# Patient Record
Sex: MALE | Race: WHITE | URBAN - METROPOLITAN AREA
[De-identification: names, ages, dates, MRNs, and addresses within clinical notes are randomized per-mention and may not be internally consistent; named-entity substitution may affect disease eponyms.]

---

## 2017-06-14 ENCOUNTER — IMPORTED ENCOUNTER (OUTPATIENT)
Dept: URBAN - METROPOLITAN AREA CLINIC 50 | Facility: CLINIC | Age: 67
End: 2017-06-14

## 2017-06-15 ENCOUNTER — IMPORTED ENCOUNTER (OUTPATIENT)
Dept: URBAN - METROPOLITAN AREA CLINIC 50 | Facility: CLINIC | Age: 67
End: 2017-06-15

## 2017-07-24 ENCOUNTER — IMPORTED ENCOUNTER (OUTPATIENT)
Dept: URBAN - METROPOLITAN AREA CLINIC 50 | Facility: CLINIC | Age: 67
End: 2017-07-24

## 2017-11-20 ENCOUNTER — IMPORTED ENCOUNTER (OUTPATIENT)
Dept: URBAN - METROPOLITAN AREA CLINIC 50 | Facility: CLINIC | Age: 67
End: 2017-11-20

## 2019-05-13 ENCOUNTER — IMPORTED ENCOUNTER (OUTPATIENT)
Dept: URBAN - METROPOLITAN AREA CLINIC 50 | Facility: CLINIC | Age: 69
End: 2019-05-13

## 2019-09-19 NOTE — PATIENT DISCUSSION
Continue care with PCP. Monitor blood sugar and A1C levels. Recommended yearly dilated eye exams to monitor for ocular complications.

## 2019-09-19 NOTE — PATIENT DISCUSSION
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 Raghu Stephenson).

## 2019-09-19 NOTE — PATIENT DISCUSSION
Discussed with patient repeating HVF OS since OCT is changing,consider lowering IOP based on oct and HVF findings.

## 2019-09-19 NOTE — PATIENT DISCUSSION
Recommend artificial tears 3-4 times a day(samples to patient)Consider plugs if no improvement with Artificial tears.

## 2019-10-03 NOTE — PATIENT DISCUSSION
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 Christiano Rubi).

## 2020-05-14 NOTE — PATIENT DISCUSSION
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 Paulette Morales).

## 2020-05-18 ENCOUNTER — IMPORTED ENCOUNTER (OUTPATIENT)
Dept: URBAN - METROPOLITAN AREA CLINIC 50 | Facility: CLINIC | Age: 70
End: 2020-05-18

## 2020-06-15 NOTE — PATIENT DISCUSSION
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 Candi Valverde).

## 2020-06-15 NOTE — PATIENT DISCUSSION
IOP borderline today. [facility] spoke with Dr. Paulette Morales via phone on 6/2/20 regarding Pt's IOP (see communications). Per Pt, Dr. Paulette Morales did not start any additional drops yet.

## 2020-07-06 NOTE — PATIENT DISCUSSION
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 Larson).

## 2020-11-19 NOTE — PATIENT DISCUSSION
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 Preeti Cole).

## 2021-02-22 NOTE — PATIENT DISCUSSION
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 Anne Rose).

## 2021-04-18 ASSESSMENT — VISUAL ACUITY
OS_CC: J1+
OD_CC: 20/20
OD_CC: J1+
OD_BAT: 20/25
OS_BAT: 20/25
OD_CC: J1+
OD_OTHER: 20/25. 20/25.
OD_CC: 20/20
OS_CC: 20/20
OS_OTHER: 20/25. 20/30.
OS_CC: 20/20
OD_CC: J1+@ 15 IN
OS_CC: J1+@ 15 IN
OS_CC: 20/20
OD_CC: 20/20
OS_CC: 20/20
OS_CC: J1+
OS_CC: 20/20-
OD_CC: 20/20
OD_CC: 20/15

## 2021-04-18 ASSESSMENT — TONOMETRY
OD_IOP_MMHG: 17
OD_IOP_MMHG: 17
OS_IOP_MMHG: 17
OS_IOP_MMHG: 16
OD_IOP_MMHG: 15
OD_IOP_MMHG: 16
OS_IOP_MMHG: 15
OD_IOP_MMHG: 17
OS_IOP_MMHG: 15
OS_IOP_MMHG: 17

## 2021-05-03 ENCOUNTER — COMPREHENSIVE EXAM (OUTPATIENT)
Dept: URBAN - METROPOLITAN AREA CLINIC 52 | Facility: CLINIC | Age: 71
End: 2021-05-03

## 2021-05-03 DIAGNOSIS — H25.13: ICD-10-CM

## 2021-05-03 DIAGNOSIS — H43.813: ICD-10-CM

## 2021-05-03 PROCEDURE — 92134 CPTRZ OPH DX IMG PST SGM RTA: CPT

## 2021-05-03 PROCEDURE — 92014 COMPRE OPH EXAM EST PT 1/>: CPT

## 2021-05-03 ASSESSMENT — VISUAL ACUITY
OD_CC: 20/20-
OS_CC: 20/25-
OS_GLARE: 20/40
OD_GLARE: >20/400
OD_CC: J1+
OD_GLARE: 20/60
OS_CC: J1+
OS_GLARE: 20/40
OU_CC: J1+

## 2021-05-03 ASSESSMENT — TONOMETRY
OS_IOP_MMHG: 16
OD_IOP_MMHG: 15

## 2021-08-23 NOTE — PATIENT DISCUSSION
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 Cyrus Rivas).

## 2021-09-07 NOTE — PATIENT DISCUSSION
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 Fatmata Penn).

## 2021-09-21 NOTE — PATIENT DISCUSSION
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 Rich Rice).

## 2022-02-24 NOTE — PATIENT DISCUSSION
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 Jr Rodriguez).

## 2022-04-29 ENCOUNTER — PREPPED CHART (OUTPATIENT)
Dept: URBAN - METROPOLITAN AREA CLINIC 52 | Facility: CLINIC | Age: 72
End: 2022-04-29

## 2022-05-02 ENCOUNTER — COMPREHENSIVE EXAM (OUTPATIENT)
Dept: URBAN - METROPOLITAN AREA CLINIC 52 | Facility: CLINIC | Age: 72
End: 2022-05-02

## 2022-05-02 DIAGNOSIS — H43.813: ICD-10-CM

## 2022-05-02 DIAGNOSIS — H25.13: ICD-10-CM

## 2022-05-02 DIAGNOSIS — H04.123: ICD-10-CM

## 2022-05-02 PROCEDURE — 92015 DETERMINE REFRACTIVE STATE: CPT

## 2022-05-02 PROCEDURE — 92014 COMPRE OPH EXAM EST PT 1/>: CPT

## 2022-05-02 ASSESSMENT — VISUAL ACUITY
OS_CC: 20/20
OD_CC: 20/20
OU_CC: J1+

## 2022-05-02 ASSESSMENT — TONOMETRY
OS_IOP_MMHG: 11
OD_IOP_MMHG: 12

## 2022-08-31 NOTE — PATIENT DISCUSSION
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 Larry Forman).

## 2023-05-22 ENCOUNTER — COMPREHENSIVE EXAM (OUTPATIENT)
Dept: URBAN - METROPOLITAN AREA CLINIC 53 | Facility: CLINIC | Age: 73
End: 2023-05-22

## 2023-05-22 DIAGNOSIS — H25.13: ICD-10-CM

## 2023-05-22 DIAGNOSIS — H43.813: ICD-10-CM

## 2023-05-22 DIAGNOSIS — H04.123: ICD-10-CM

## 2023-05-22 DIAGNOSIS — H52.4: ICD-10-CM

## 2023-05-22 PROCEDURE — 92014 COMPRE OPH EXAM EST PT 1/>: CPT

## 2023-05-22 PROCEDURE — 92015 DETERMINE REFRACTIVE STATE: CPT

## 2023-05-22 ASSESSMENT — VISUAL ACUITY
OD_GLARE: 20/25
OS_CC: 20/20
OU_CC: J1+@16"
OS_GLARE: 20/25
OD_CC: 20/20
OD_GLARE: 20/20
OS_GLARE: 20/20

## 2023-05-22 ASSESSMENT — TONOMETRY
OD_IOP_MMHG: 16
OS_IOP_MMHG: 17

## 2024-01-10 NOTE — PATIENT DISCUSSION
Kidney test is normal  Liver enzymes are elevated, but stable no significant changes  I advise you to avoid alcohol and eat low-fat sugar-free healthy diet  Blood count is normal no anemia  Iron level is normal  Blood sugar is normal no diabetes NOT VISUALLY SIGNIFICANT: Informed patient that their cataract is not visually significant or does not meet the criteria for cataract surgery. Recommend attention to cataract symptoms monitoring by regular examinations. Patient instructed to call with changes in vision.

## 2024-04-29 ENCOUNTER — COMPREHENSIVE EXAM (OUTPATIENT)
Dept: URBAN - METROPOLITAN AREA CLINIC 53 | Facility: CLINIC | Age: 74
End: 2024-04-29

## 2024-04-29 DIAGNOSIS — H52.4: ICD-10-CM

## 2024-04-29 DIAGNOSIS — H25.13: ICD-10-CM

## 2024-04-29 PROCEDURE — 99214 OFFICE O/P EST MOD 30 MIN: CPT

## 2024-04-29 PROCEDURE — 92015 DETERMINE REFRACTIVE STATE: CPT

## 2024-04-29 ASSESSMENT — VISUAL ACUITY
OD_CC: 20/20
OU_CC: 20/20-1
OS_CC: 20/50
OS_PH: 20/25-1
OS_GLARE: 20/20
OD_GLARE: 20/20
OU_CC: J1+
OD_GLARE: 20/20
OS_GLARE: 20/20

## 2024-04-29 ASSESSMENT — TONOMETRY
OS_IOP_MMHG: 11
OD_IOP_MMHG: 13

## 2025-05-05 ENCOUNTER — CONSULTATION/EVALUATION (OUTPATIENT)
Age: 75
End: 2025-05-05

## 2025-05-05 DIAGNOSIS — H52.4: ICD-10-CM

## 2025-05-05 DIAGNOSIS — H17.9: ICD-10-CM

## 2025-05-05 DIAGNOSIS — H43.813: ICD-10-CM

## 2025-05-05 DIAGNOSIS — H04.123: ICD-10-CM

## 2025-05-05 DIAGNOSIS — H25.13: ICD-10-CM

## 2025-05-05 PROCEDURE — 92015 DETERMINE REFRACTIVE STATE: CPT

## 2025-05-05 PROCEDURE — 99214 OFFICE O/P EST MOD 30 MIN: CPT
